# Patient Record
Sex: MALE | Race: WHITE | URBAN - METROPOLITAN AREA
[De-identification: names, ages, dates, MRNs, and addresses within clinical notes are randomized per-mention and may not be internally consistent; named-entity substitution may affect disease eponyms.]

---

## 2018-10-24 ENCOUNTER — TELEPHONE (OUTPATIENT)
Dept: TRANSPLANT | Facility: CLINIC | Age: 68
End: 2018-10-24

## 2018-10-24 NOTE — LETTER
October 24, 2018      Dear David Durham Jr.,    This letter is to inform you that you remain listed on the liver transplant wait list at the [facility]. You have been on the transplant list since 2009.   Our transplant team has attempted to reach you by telephone in order to review your status in our transplant program; however, we were not able to reach you.   At your earliest convenience, please contact us at [phone] to schedule a follow up visit. It is important for you to have regular follow up here if you wish to remain listed. If we do not hear from you, your case may be reviewed for removal from our transplant list.  Thank you for your attention to this matter. We look forward to hearing from you soon.   Sincerely,   Angelique Bustamante RN  Liver Transplant Coordinator   [phone]

## 2018-10-24 NOTE — TELEPHONE ENCOUNTER
Pt listed for liver transplant 3/2009. Pt was last seen here 12/2009 by Dr. Kincaid and was made inactive due to being to well 8/2010. Attempted to reach pt to discuss status on transplant list, unable to reach pt by phone, letter sent.

## 2018-10-29 ENCOUNTER — TELEPHONE (OUTPATIENT)
Dept: TRANSPLANT | Facility: CLINIC | Age: 68
End: 2018-10-29

## 2018-10-29 NOTE — TELEPHONE ENCOUNTER
Patient Call: General    Reason for call: patient calling regarding a letter that was sent to the patient.    Call back needed? Yes    Return Call Needed  Same as documented in contacts section  When to return call?: Same day: Route High Priority

## 2018-10-30 ENCOUNTER — COMMITTEE REVIEW (OUTPATIENT)
Dept: TRANSPLANT | Facility: CLINIC | Age: 68
End: 2018-10-30

## 2018-10-30 NOTE — COMMITTEE REVIEW
Abdominal Committee Review Note     Evaluation Date: 3/20/2009  Committee Review Date: 10/30/2018    Organ being evaluated for: Liver    Transplant Phase: Waitlist  Transplant Status: Inactive    Transplant Coordinator: Erika Mendiola  Transplant Surgeon:       Referring Physician:     Primary Diagnosis: Cirrhosis: Type C  Secondary Diagnosis:     Committee Review Members:  Nutrition Zo Espinoza, RD   Pharmacy Nikolas Morris, [facility]    - Clinical CHAR Rodriguez, Patricia Virgen, [facility]   Transplant Sanna Moore MD, Lucas Lema MD, Jr Darrell Moody RN, Estrellita Cardoso, APRN CNP, Sarai Ruiz, [place], Kennedi Stephenson MD, Shane Zuleta MD, Angelique Bustamante RN, Ozzy Walsh MD, Timi Casiano MD       Transplant Eligibility:     Committee Review Decision: Remove    Relative Contraindications:     Absolute Contraindications:     Committee Chair Lucas Lema MD verbally attested to the committee's decision.    Committee Discussion Details:     Remove from list

## 2018-10-30 NOTE — LETTER
David Durham Jr.  [address] [place] [address] [address] [place] [id number]    October 31, 2018    Dear David    This letter is being sent to notify you that you were removed from the liver transplant waiting list at the [facility] on 10/31/2018.  You were removed because you have transferred all of your care to your providers in [place].  We recommend that you continue to follow with your care providers for continued management of your liver disease.    You have been sent this letter to comply with the [employer] ([employer]) guidelines.    Finally, attached is a letter from the [employer] ([employer]).  It describes the services and information offered to patients by [employer] and the [employer].    We appreciate having had the opportunity to participate in your care.  If you have questions, please feel free to call the Transplant Office at [phone] or call [phone] to schedule an appointment.    Sincerely,  Angelique Bustamante RN  Liver Transplant Coordinator       Encl: [employer] Letter

## 2018-10-30 NOTE — TELEPHONE ENCOUNTER
Return call received from pt. Pt reports he has relocated to [place] where he has established hepatology/transplant care. Pt feels he should not be listed for transplant at [facility], he has relocated and is unable to complete follow up in [place]. Pt added to selection conference, anticipate delisting. Pt comfortable with this plan.

## 2019-11-08 ENCOUNTER — HEALTH MAINTENANCE LETTER (OUTPATIENT)
Age: 69
End: 2019-11-08

## 2020-02-23 ENCOUNTER — HEALTH MAINTENANCE LETTER (OUTPATIENT)
Age: 70
End: 2020-02-23

## 2020-12-06 ENCOUNTER — HEALTH MAINTENANCE LETTER (OUTPATIENT)
Age: 70
End: 2020-12-06

## 2021-04-11 ENCOUNTER — HEALTH MAINTENANCE LETTER (OUTPATIENT)
Age: 71
End: 2021-04-11

## 2021-09-25 ENCOUNTER — HEALTH MAINTENANCE LETTER (OUTPATIENT)
Age: 71
End: 2021-09-25

## 2022-05-07 ENCOUNTER — HEALTH MAINTENANCE LETTER (OUTPATIENT)
Age: 72
End: 2022-05-07

## 2022-06-07 ENCOUNTER — NEW PATIENT (OUTPATIENT)
Dept: URBAN - METROPOLITAN AREA CLINIC 53 | Facility: CLINIC | Age: 72
End: 2022-06-07

## 2022-06-07 DIAGNOSIS — H35.373: ICD-10-CM

## 2022-06-07 DIAGNOSIS — H35.363: ICD-10-CM

## 2022-06-07 DIAGNOSIS — H10.13: ICD-10-CM

## 2022-06-07 DIAGNOSIS — H16.223: ICD-10-CM

## 2022-06-07 DIAGNOSIS — H43.813: ICD-10-CM

## 2022-06-07 PROCEDURE — 92004 COMPRE OPH EXAM NEW PT 1/>: CPT

## 2022-06-07 PROCEDURE — 92134 CPTRZ OPH DX IMG PST SGM RTA: CPT

## 2022-06-07 RX ORDER — OLOPATADINE HYDROCHLORIDE 2 MG/ML: 1 SOLUTION OPHTHALMIC ONCE A DAY

## 2022-06-07 ASSESSMENT — TONOMETRY
OD_IOP_MMHG: 21
OS_IOP_MMHG: 21

## 2022-06-07 ASSESSMENT — VISUAL ACUITY
OD_SC: 20/25-1
OS_CC: 20/25
OS_SC: 20/25
OD_CC: 20/25
OU_CC: J1+ @ 16 IN

## 2022-12-13 ENCOUNTER — FOLLOW UP (OUTPATIENT)
Dept: URBAN - METROPOLITAN AREA CLINIC 53 | Facility: CLINIC | Age: 72
End: 2022-12-13

## 2022-12-13 PROCEDURE — 92134 CPTRZ OPH DX IMG PST SGM RTA: CPT

## 2022-12-13 PROCEDURE — 92012 INTRM OPH EXAM EST PATIENT: CPT

## 2022-12-13 PROCEDURE — 92133 CPTRZD OPH DX IMG PST SGM ON: CPT

## 2022-12-13 PROCEDURE — 76514 ECHO EXAM OF EYE THICKNESS: CPT

## 2022-12-13 RX ORDER — OLOPATADINE HYDROCHLORIDE 2 MG/ML: 1 SOLUTION OPHTHALMIC ONCE A DAY

## 2022-12-13 ASSESSMENT — VISUAL ACUITY
OD_SC: 20/20
OS_SC: 20/20

## 2022-12-13 ASSESSMENT — TONOMETRY
OS_IOP_MMHG: 18
OD_IOP_MMHG: 14
OS_IOP_MMHG: 13
OD_IOP_MMHG: 18

## 2022-12-13 ASSESSMENT — PACHYMETRY
OD_CT_UM: 601
OS_CT_UM: 616

## 2022-12-15 ENCOUNTER — CONSULTATION/EVALUATION (OUTPATIENT)
Dept: URBAN - METROPOLITAN AREA CLINIC 53 | Facility: CLINIC | Age: 72
End: 2022-12-15

## 2022-12-15 PROCEDURE — 92012 INTRM OPH EXAM EST PATIENT: CPT

## 2022-12-15 PROCEDURE — 68801 DILATE TEAR DUCT OPENING: CPT

## 2022-12-15 ASSESSMENT — VISUAL ACUITY
OU_SC: 20/20
OS_SC: 20/25
OD_SC: 20/20

## 2022-12-15 ASSESSMENT — TONOMETRY
OS_IOP_MMHG: 15
OD_IOP_MMHG: 11
OD_IOP_MMHG: 15
OS_IOP_MMHG: 10

## 2022-12-22 ENCOUNTER — FOLLOW UP (OUTPATIENT)
Dept: URBAN - METROPOLITAN AREA CLINIC 53 | Facility: CLINIC | Age: 72
End: 2022-12-22

## 2022-12-22 PROCEDURE — 92012 INTRM OPH EXAM EST PATIENT: CPT

## 2022-12-22 ASSESSMENT — TONOMETRY
OS_IOP_MMHG: 11
OD_IOP_MMHG: 14
OS_IOP_MMHG: 16
OD_IOP_MMHG: 18

## 2022-12-22 ASSESSMENT — VISUAL ACUITY
OD_SC: 20/20
OS_SC: 20/25
OU_SC: 20/20

## 2023-04-22 ENCOUNTER — HEALTH MAINTENANCE LETTER (OUTPATIENT)
Age: 73
End: 2023-04-22

## 2023-06-02 ENCOUNTER — HEALTH MAINTENANCE LETTER (OUTPATIENT)
Age: 73
End: 2023-06-02

## 2023-06-06 ENCOUNTER — COMPREHENSIVE EXAM (OUTPATIENT)
Dept: URBAN - METROPOLITAN AREA CLINIC 53 | Facility: CLINIC | Age: 73
End: 2023-06-06

## 2023-06-06 DIAGNOSIS — H35.373: ICD-10-CM

## 2023-06-06 DIAGNOSIS — H35.363: ICD-10-CM

## 2023-06-06 DIAGNOSIS — H43.813: ICD-10-CM

## 2023-06-06 DIAGNOSIS — H40.053: ICD-10-CM

## 2023-06-06 PROCEDURE — 92014 COMPRE OPH EXAM EST PT 1/>: CPT

## 2023-06-06 PROCEDURE — 92083 EXTENDED VISUAL FIELD XM: CPT

## 2023-06-06 PROCEDURE — 92133 CPTRZD OPH DX IMG PST SGM ON: CPT

## 2023-06-06 ASSESSMENT — TONOMETRY
OD_IOP_MMHG: 18
OS_IOP_MMHG: 12
OS_IOP_MMHG: 17
OD_IOP_MMHG: 14

## 2023-06-06 ASSESSMENT — VISUAL ACUITY
OU_SC: J1@16"
OS_GLARE: 20/25
OD_GLARE: 20/25
OD_GLARE: 20/25
OS_GLARE: 20/25
OS_SC: 20/25
OD_SC: 20/25

## 2023-07-19 ENCOUNTER — CONSULTATION/EVALUATION (OUTPATIENT)
Dept: URBAN - METROPOLITAN AREA CLINIC 49 | Facility: CLINIC | Age: 73
End: 2023-07-19

## 2023-07-19 DIAGNOSIS — H16.223: ICD-10-CM

## 2023-07-19 DIAGNOSIS — H04.561: ICD-10-CM

## 2023-07-19 DIAGNOSIS — H04.551: ICD-10-CM

## 2023-07-19 DIAGNOSIS — H04.201: ICD-10-CM

## 2023-07-19 DIAGNOSIS — H02.132: ICD-10-CM

## 2023-07-19 PROCEDURE — 92285 EXTERNAL OCULAR PHOTOGRAPHY: CPT

## 2023-07-19 PROCEDURE — 92012 INTRM OPH EXAM EST PATIENT: CPT

## 2023-07-19 ASSESSMENT — TONOMETRY
OD_IOP_MMHG: 11
OS_IOP_MMHG: 15
OD_IOP_MMHG: 15
OS_IOP_MMHG: 10

## 2023-07-19 ASSESSMENT — VISUAL ACUITY
OU_SC: 20/20
OD_SC: 20/20
OS_PH: 20/25
OS_SC: 20/30-2

## 2023-09-13 ENCOUNTER — PRE-OP/H&P (OUTPATIENT)
Dept: URBAN - METROPOLITAN AREA CLINIC 49 | Facility: LOCATION | Age: 73
End: 2023-09-13

## 2023-09-13 DIAGNOSIS — H02.132: ICD-10-CM

## 2023-09-13 DIAGNOSIS — H04.561: ICD-10-CM

## 2023-09-13 PROCEDURE — PREOP PRE OP VISIT

## 2023-09-13 ASSESSMENT — TONOMETRY
OS_IOP_MMHG: 12
OD_IOP_MMHG: 12
OS_IOP_MMHG: 17
OD_IOP_MMHG: 16

## 2023-09-13 ASSESSMENT — VISUAL ACUITY
OS_SC: 20/30-1
OS_PH: 20/25
OD_SC: 20/30-2
OD_PH: 20/25

## 2023-09-19 ENCOUNTER — SURGERY/PROCEDURE (OUTPATIENT)
Dept: URBAN - METROPOLITAN AREA SURGERY 16 | Facility: SURGERY | Age: 73
End: 2023-09-19

## 2023-09-19 DIAGNOSIS — H02.132: ICD-10-CM

## 2023-09-19 DIAGNOSIS — H04.561: ICD-10-CM

## 2023-09-19 PROCEDURE — 67917 REPAIR EYELID DEFECT: CPT

## 2023-09-19 PROCEDURE — 68440 SNIP INC LACRIMAL PUNCTUM: CPT

## 2023-09-28 ENCOUNTER — POST-OP (OUTPATIENT)
Dept: URBAN - METROPOLITAN AREA CLINIC 53 | Facility: CLINIC | Age: 73
End: 2023-09-28

## 2023-09-28 DIAGNOSIS — Z98.890: ICD-10-CM

## 2023-09-28 PROCEDURE — 99024 POSTOP FOLLOW-UP VISIT: CPT

## 2023-09-28 ASSESSMENT — VISUAL ACUITY
OS_PH: 20/20
OD_PH: 20/25
OD_SC: 20/30
OS_SC: 20/30-1

## 2023-09-28 ASSESSMENT — TONOMETRY
OS_IOP_MMHG: 18
OD_IOP_MMHG: 14
OS_IOP_MMHG: 13
OD_IOP_MMHG: 18

## 2023-10-19 ENCOUNTER — POST-OP (OUTPATIENT)
Dept: URBAN - METROPOLITAN AREA CLINIC 53 | Facility: CLINIC | Age: 73
End: 2023-10-19

## 2023-10-19 DIAGNOSIS — Z98.890: ICD-10-CM

## 2023-10-19 PROCEDURE — 92285 EXTERNAL OCULAR PHOTOGRAPHY: CPT | Mod: NC

## 2023-10-19 PROCEDURE — 99024 POSTOP FOLLOW-UP VISIT: CPT

## 2023-10-19 ASSESSMENT — VISUAL ACUITY
OS_SC: 20/20
OD_SC: 20/20

## 2023-11-29 ENCOUNTER — EMERGENCY VISIT (OUTPATIENT)
Dept: URBAN - METROPOLITAN AREA CLINIC 49 | Facility: LOCATION | Age: 73
End: 2023-11-29

## 2023-11-29 DIAGNOSIS — L98.0: ICD-10-CM

## 2023-11-29 PROCEDURE — 99024 POSTOP FOLLOW-UP VISIT: CPT

## 2023-11-29 ASSESSMENT — TONOMETRY
OD_IOP_MMHG: 16
OD_IOP_MMHG: 12
OS_IOP_MMHG: 16
OS_IOP_MMHG: 11

## 2023-11-29 ASSESSMENT — VISUAL ACUITY
OS_SC: 20/20
OD_SC: 20/25-2
OU_SC: 20/20-1

## 2023-12-11 ENCOUNTER — PRE-OP/H&P (OUTPATIENT)
Dept: URBAN - METROPOLITAN AREA CLINIC 49 | Facility: LOCATION | Age: 73
End: 2023-12-11

## 2023-12-11 DIAGNOSIS — L98.0: ICD-10-CM

## 2023-12-11 PROCEDURE — PREOP PRE OP VISIT

## 2023-12-11 ASSESSMENT — VISUAL ACUITY
OS_SC: 20/20-1
OD_SC: 20/20-1

## 2023-12-19 ENCOUNTER — SURGERY/PROCEDURE (OUTPATIENT)
Dept: URBAN - METROPOLITAN AREA SURGERY 16 | Facility: SURGERY | Age: 73
End: 2023-12-19

## 2023-12-19 DIAGNOSIS — D48.5: ICD-10-CM

## 2023-12-19 PROCEDURE — 67840 REMOVE EYELID LESION: CPT

## 2024-01-03 ENCOUNTER — POST-OP (OUTPATIENT)
Dept: URBAN - METROPOLITAN AREA CLINIC 49 | Facility: LOCATION | Age: 74
End: 2024-01-03

## 2024-01-03 DIAGNOSIS — Z98.890: ICD-10-CM

## 2024-01-03 PROCEDURE — 99024 POSTOP FOLLOW-UP VISIT: CPT

## 2024-01-03 ASSESSMENT — VISUAL ACUITY
OD_SC: 20/20
OS_SC: 20/20

## 2024-01-31 ENCOUNTER — POST-OP (OUTPATIENT)
Dept: URBAN - METROPOLITAN AREA CLINIC 49 | Facility: LOCATION | Age: 74
End: 2024-01-31

## 2024-01-31 DIAGNOSIS — Z98.890: ICD-10-CM

## 2024-01-31 ASSESSMENT — VISUAL ACUITY
OS_SC: 20/25
OD_SC: 20/25-2
OU_SC: 20/20

## 2024-03-06 ENCOUNTER — EMERGENCY VISIT (OUTPATIENT)
Dept: URBAN - METROPOLITAN AREA CLINIC 49 | Facility: LOCATION | Age: 74
End: 2024-03-06

## 2024-03-06 DIAGNOSIS — H04.201: ICD-10-CM

## 2024-03-06 DIAGNOSIS — L98.0: ICD-10-CM

## 2024-03-06 PROCEDURE — 99213 OFFICE O/P EST LOW 20 MIN: CPT

## 2024-03-06 ASSESSMENT — TONOMETRY
OD_IOP_MMHG: 12
OS_IOP_MMHG: 15
OS_IOP_MMHG: 11
OD_IOP_MMHG: 16

## 2024-03-06 ASSESSMENT — VISUAL ACUITY
OD_PH: 20/25+2
OS_SC: 20/20-1
OD_SC: 20/30+2
OU_SC: 20/20

## 2024-03-14 ENCOUNTER — FOLLOW UP (OUTPATIENT)
Dept: URBAN - METROPOLITAN AREA CLINIC 53 | Facility: CLINIC | Age: 74
End: 2024-03-14

## 2024-03-14 DIAGNOSIS — L98.0: ICD-10-CM

## 2024-03-14 PROCEDURE — 99213 OFFICE O/P EST LOW 20 MIN: CPT

## 2024-03-14 ASSESSMENT — VISUAL ACUITY
OD_SC: 20/25-1 PUSH
OS_SC: 20/25 PUSH

## 2024-06-12 ENCOUNTER — COMPREHENSIVE EXAM (OUTPATIENT)
Dept: URBAN - METROPOLITAN AREA CLINIC 53 | Facility: CLINIC | Age: 74
End: 2024-06-12

## 2024-06-12 DIAGNOSIS — H43.813: ICD-10-CM

## 2024-06-12 DIAGNOSIS — H52.4: ICD-10-CM

## 2024-06-12 DIAGNOSIS — H16.223: ICD-10-CM

## 2024-06-12 DIAGNOSIS — H40.053: ICD-10-CM

## 2024-06-12 DIAGNOSIS — H35.363: ICD-10-CM

## 2024-06-12 DIAGNOSIS — H26.491: ICD-10-CM

## 2024-06-12 DIAGNOSIS — H35.373: ICD-10-CM

## 2024-06-12 PROCEDURE — 92134 CPTRZ OPH DX IMG PST SGM RTA: CPT

## 2024-06-12 PROCEDURE — 99214 OFFICE O/P EST MOD 30 MIN: CPT

## 2024-06-12 PROCEDURE — 92015 DETERMINE REFRACTIVE STATE: CPT

## 2024-06-12 ASSESSMENT — VISUAL ACUITY
OS_SC: 20/25
OU_SC: 20/25
OU_SC: J1 @ 14"
OD_SC: 20/25-2

## 2024-06-12 ASSESSMENT — KERATOMETRY
OS_K1POWER_DIOPTERS: 43.50
OD_AXISANGLE_DEGREES: 88
OS_K2POWER_DIOPTERS: 44.00
OD_K1POWER_DIOPTERS: 42.50
OD_K2POWER_DIOPTERS: 42.75
OS_AXISANGLE_DEGREES: 13
OD_AXISANGLE2_DEGREES: 178
OS_AXISANGLE2_DEGREES: 103

## 2024-06-12 ASSESSMENT — TONOMETRY
OD_IOP_MMHG: 18
OS_IOP_MMHG: 15
OS_IOP_MMHG: 20
OD_IOP_MMHG: 22